# Patient Record
Sex: FEMALE | Race: WHITE | Employment: FULL TIME | URBAN - METROPOLITAN AREA
[De-identification: names, ages, dates, MRNs, and addresses within clinical notes are randomized per-mention and may not be internally consistent; named-entity substitution may affect disease eponyms.]

---

## 2018-04-13 ENCOUNTER — OFFICE VISIT (OUTPATIENT)
Dept: FAMILY MEDICINE CLINIC | Facility: CLINIC | Age: 33
End: 2018-04-13

## 2018-04-13 VITALS
SYSTOLIC BLOOD PRESSURE: 96 MMHG | HEART RATE: 72 BPM | WEIGHT: 97 LBS | DIASTOLIC BLOOD PRESSURE: 54 MMHG | BODY MASS INDEX: 17.85 KG/M2 | HEIGHT: 62 IN

## 2018-04-13 DIAGNOSIS — Z00.00 ROUTINE GENERAL MEDICAL EXAMINATION AT A HEALTH CARE FACILITY: Primary | ICD-10-CM

## 2018-04-13 PROCEDURE — 99214 OFFICE O/P EST MOD 30 MIN: CPT | Performed by: FAMILY MEDICINE

## 2018-04-13 NOTE — PROGRESS NOTES
Martir Hunter is a 28year old female who is here for Patient presents with:   Well Adult      HPI:     Here for physical    Has mole she is concnered about on leg    Screening:  Diet: tries to eat healthy - could eat more consistently, limit junk food [Other] [OTHER] Maternal Grandfather    • lung cancer [Other] [OTHER] Maternal Uncle       No past medical history on file. No past surgical history on file.    Social History:    Smoking status: Never Smoker

## 2018-04-13 NOTE — PATIENT INSTRUCTIONS
-- work on healthier, more protein rich snacking throughout the day to eat more consistently (nuts, eggs, beans, etc)  -- more cardio exercise  -- less salt in diet    -- come in for fasting bloodwork anytime that you are able to (8-10h no food, only alo

## 2018-04-20 ENCOUNTER — LAB ENCOUNTER (OUTPATIENT)
Dept: LAB | Age: 33
End: 2018-04-20
Attending: FAMILY MEDICINE
Payer: COMMERCIAL

## 2018-04-20 DIAGNOSIS — Z00.00 ROUTINE GENERAL MEDICAL EXAMINATION AT A HEALTH CARE FACILITY: ICD-10-CM

## 2018-04-20 PROCEDURE — 80050 GENERAL HEALTH PANEL: CPT | Performed by: FAMILY MEDICINE

## 2018-04-20 PROCEDURE — 82306 VITAMIN D 25 HYDROXY: CPT | Performed by: FAMILY MEDICINE

## 2018-04-20 PROCEDURE — 80061 LIPID PANEL: CPT | Performed by: FAMILY MEDICINE

## 2018-04-20 PROCEDURE — 36415 COLL VENOUS BLD VENIPUNCTURE: CPT | Performed by: FAMILY MEDICINE

## 2018-04-21 NOTE — PROGRESS NOTES
Vit D low - Vit D 50,000 units weekly x 12 wks, then recheck Vit D upon completion   -rest of labs normal

## 2018-04-23 ENCOUNTER — TELEPHONE (OUTPATIENT)
Dept: FAMILY MEDICINE CLINIC | Facility: CLINIC | Age: 33
End: 2018-04-23

## 2018-04-23 DIAGNOSIS — E55.9 VITAMIN D DEFICIENCY: Primary | ICD-10-CM

## 2018-04-23 RX ORDER — ERGOCALCIFEROL 1.25 MG/1
50000 CAPSULE ORAL WEEKLY
Qty: 12 CAPSULE | Refills: 0 | Status: SHIPPED | OUTPATIENT
Start: 2018-04-23 | End: 2018-07-22

## 2018-04-23 NOTE — TELEPHONE ENCOUNTER
Per Dr. Jono Vargas, the patient was informed of her test results and Dr. Nirmal Dyer recommendations via my chart. Vitamin d prescription was sent to the patient's preferred pharmacy and future vitamin d lab was ordered.

## 2018-04-23 NOTE — TELEPHONE ENCOUNTER
----- Message from Karen Rainey MD sent at 4/21/2018  1:24 PM CDT -----  Vit D low - Vit D 50,000 units weekly x 12 wks, then recheck Vit D upon completion   -rest of labs normal

## 2018-05-16 ENCOUNTER — TELEPHONE (OUTPATIENT)
Dept: OBGYN CLINIC | Facility: CLINIC | Age: 33
End: 2018-05-16

## 2018-05-16 DIAGNOSIS — O26.859 SPOTTING IN PREGNANCY: Primary | ICD-10-CM

## 2018-05-16 NOTE — TELEPHONE ENCOUNTER
LMP 4/15/18. First pregnancy, feeling well, no complaints. NOB appt scheduled. Pt advised to start PNV w/ DHA. Pt advised to call with any questions or concerns before appt. Patient verbalized understanding.

## 2018-05-16 NOTE — TELEPHONE ENCOUNTER
Patient called and wants to schedule a new OB appt. Date of the first day of her last period was April 15th.     [facility]PO

## 2018-05-21 ENCOUNTER — HOSPITAL ENCOUNTER (EMERGENCY)
Facility: HOSPITAL | Age: 33
Discharge: HOME | End: 2018-05-21
Attending: EMERGENCY MEDICINE
Payer: COMMERCIAL

## 2018-05-21 ENCOUNTER — LAB ENCOUNTER (OUTPATIENT)
Dept: LAB | Age: 33
End: 2018-05-21
Attending: OBSTETRICS & GYNECOLOGY
Payer: COMMERCIAL

## 2018-05-21 ENCOUNTER — APPOINTMENT (OUTPATIENT)
Dept: ULTRASOUND IMAGING | Facility: HOSPITAL | Age: 33
End: 2018-05-21
Attending: EMERGENCY MEDICINE
Payer: COMMERCIAL

## 2018-05-21 VITALS
TEMPERATURE: 97 F | BODY MASS INDEX: 18.03 KG/M2 | RESPIRATION RATE: 16 BRPM | WEIGHT: 98 LBS | OXYGEN SATURATION: 99 % | HEART RATE: 73 BPM | SYSTOLIC BLOOD PRESSURE: 105 MMHG | HEIGHT: 62 IN | DIASTOLIC BLOOD PRESSURE: 65 MMHG

## 2018-05-21 DIAGNOSIS — O03.9 SPONTANEOUS ABORTION: ICD-10-CM

## 2018-05-21 DIAGNOSIS — Z00.00 ROUTINE GENERAL MEDICAL EXAMINATION AT A HEALTH CARE FACILITY: Primary | ICD-10-CM

## 2018-05-21 DIAGNOSIS — O26.859 SPOTTING IN PREGNANCY: ICD-10-CM

## 2018-05-21 PROCEDURE — 84144 ASSAY OF PROGESTERONE: CPT | Performed by: EMERGENCY MEDICINE

## 2018-05-21 PROCEDURE — 81001 URINALYSIS AUTO W/SCOPE: CPT | Performed by: EMERGENCY MEDICINE

## 2018-05-21 PROCEDURE — 36415 COLL VENOUS BLD VENIPUNCTURE: CPT

## 2018-05-21 PROCEDURE — 86901 BLOOD TYPING SEROLOGIC RH(D): CPT | Performed by: EMERGENCY MEDICINE

## 2018-05-21 PROCEDURE — 76801 OB US < 14 WKS SINGLE FETUS: CPT | Performed by: EMERGENCY MEDICINE

## 2018-05-21 PROCEDURE — 86900 BLOOD TYPING SEROLOGIC ABO: CPT | Performed by: EMERGENCY MEDICINE

## 2018-05-21 PROCEDURE — 84702 CHORIONIC GONADOTROPIN TEST: CPT

## 2018-05-21 PROCEDURE — 84144 ASSAY OF PROGESTERONE: CPT

## 2018-05-21 PROCEDURE — 99284 EMERGENCY DEPT VISIT MOD MDM: CPT

## 2018-05-21 PROCEDURE — 86900 BLOOD TYPING SEROLOGIC ABO: CPT

## 2018-05-21 PROCEDURE — 80053 COMPREHEN METABOLIC PANEL: CPT | Performed by: EMERGENCY MEDICINE

## 2018-05-21 PROCEDURE — 85025 COMPLETE CBC W/AUTO DIFF WBC: CPT | Performed by: EMERGENCY MEDICINE

## 2018-05-21 PROCEDURE — 86901 BLOOD TYPING SEROLOGIC RH(D): CPT

## 2018-05-21 PROCEDURE — 76817 TRANSVAGINAL US OBSTETRIC: CPT | Performed by: EMERGENCY MEDICINE

## 2018-05-21 PROCEDURE — 84702 CHORIONIC GONADOTROPIN TEST: CPT | Performed by: EMERGENCY MEDICINE

## 2018-05-21 RX ORDER — IBUPROFEN 800 MG/1
800 TABLET ORAL EVERY 8 HOURS PRN
Qty: 30 TABLET | Refills: 0 | Status: SHIPPED | OUTPATIENT
Start: 2018-05-21 | End: 2018-05-28

## 2018-05-21 RX ORDER — CEPHALEXIN 500 MG/1
500 CAPSULE ORAL 3 TIMES DAILY
Qty: 15 CAPSULE | Refills: 0 | Status: SHIPPED | OUTPATIENT
Start: 2018-05-21 | End: 2018-05-26

## 2018-05-21 NOTE — ED INITIAL ASSESSMENT (HPI)
Pt comes to ED c/o vaginal bleeding since 2100 last night. + abdominal cramping. Pt states that she is currently 5 weeks pregnant. Pt states she only notes the bleeding when she is urinating.  She has been wearing a pad and has not gone through even one pad

## 2018-05-21 NOTE — ED NOTES
Patient updated on plan of care. Patient states she needs to urinate. Informed patient that ultrasound department states she is next to be scanned. Patient verbalized understanding.

## 2018-05-21 NOTE — TELEPHONE ENCOUNTER
NOB pending 6/13/18. Pt reports spotting and cramping starting last night; scant spotting during urination and when wiping. Pt denies any pain or burning with urination. Labs ordered per protocol. Appt scheduled for 5/24.

## 2018-05-21 NOTE — TELEPHONE ENCOUNTER
Patient is having cramping with light spotting when using washroom. Her first appt is scheduled for June 13.  Please call to advise

## 2018-05-21 NOTE — ED PROVIDER NOTES
Patient Seen in: [facility] Emergency Department    History   Patient presents with:  Eval-G (gynecologic)    Stated Complaint: 5 weeks pregnant- heavy vaginal bleeding    HPI    Patient presents to the emergency department with vaginal bleeding and neck is supple her lungs are clear to auscultation and heart has a regular rate and rhythm without murmurs rubs or gallops abdomen is soft nontender nondistended vaginal exam reveals bright red blood around the vaginal vault patient is absolutely unable to COMPARISON:  None. INDICATIONS:  5 weeks pregnant- heavy vaginal bleeding         TECHNIQUE:  Transabdominal / pelvic ultrasound examination was performed.          PATIENT STATED HISTORY: (As transcribed by Technologist)  Patient had a     positive diagnosis)  Spontaneous     Disposition:  Discharge  2018  6:18 pm    Follow-up:  Valarie Alexander, [address] [facility]  [phone]      on friday        Medications Prescribed:  Current Discharge Medication List

## 2018-05-21 NOTE — ED NOTES
Patient really uncomfortable stating she needs to urinate. RN gave patient option to urinate some and hold the rest. Patient declined option and states she will wait.

## 2018-05-22 ENCOUNTER — TELEPHONE (OUTPATIENT)
Dept: OBGYN UNIT | Facility: HOSPITAL | Age: 33
End: 2018-05-22

## 2018-05-22 NOTE — PROGRESS NOTES
Decrease in HCG consistent with spontaneous loss. Rh positive. Pt to keep appt with Dr Raine Amaya as scheduled.

## 2018-05-23 PROBLEM — O03.9 SAB (SPONTANEOUS ABORTION): Status: ACTIVE | Noted: 2018-05-23

## 2018-05-23 NOTE — PROGRESS NOTES
[facility]  Obstetrics and Gynecology  Follow Up Progress Note    Subjective:     Madhu Krishnamurthy is a 57 [address] old  female who presents for follow up SAB. Patient seen in ED with c/o vaginal bleeding and cramping on 18.  Beta HCG noted f Julieanne Claude is a 28year old  female who presents for SAB follow up     Patient Active Problem List:     SAB (spontaneous )     Left ovarian cyst        Plan:     SAB  - resolving   - d/w patient OB US findings and diagnosis of SAB includin

## 2018-05-24 ENCOUNTER — OFFICE VISIT (OUTPATIENT)
Dept: OBGYN CLINIC | Facility: CLINIC | Age: 33
End: 2018-05-24

## 2018-05-24 VITALS — SYSTOLIC BLOOD PRESSURE: 92 MMHG | DIASTOLIC BLOOD PRESSURE: 64 MMHG | BODY MASS INDEX: 18 KG/M2 | WEIGHT: 98.63 LBS

## 2018-05-24 DIAGNOSIS — O03.9 SAB (SPONTANEOUS ABORTION): Primary | ICD-10-CM

## 2018-05-24 DIAGNOSIS — N83.202 LEFT OVARIAN CYST: ICD-10-CM

## 2018-05-24 PROCEDURE — 99213 OFFICE O/P EST LOW 20 MIN: CPT | Performed by: OBSTETRICS & GYNECOLOGY

## 2018-05-24 NOTE — PATIENT INSTRUCTIONS
Repeat lab testing in 1 week and follow until pregnancy hormone level is negative     Pelvic US repeat in 6 weeks

## 2018-05-25 PROBLEM — N83.202 LEFT OVARIAN CYST: Status: ACTIVE | Noted: 2018-05-25

## 2018-05-31 ENCOUNTER — LAB ENCOUNTER (OUTPATIENT)
Dept: LAB | Age: 33
End: 2018-05-31
Attending: OBSTETRICS & GYNECOLOGY
Payer: COMMERCIAL

## 2018-05-31 DIAGNOSIS — O26.859 SPOTTING IN PREGNANCY: ICD-10-CM

## 2018-05-31 DIAGNOSIS — O03.9 SAB (SPONTANEOUS ABORTION): ICD-10-CM

## 2018-05-31 DIAGNOSIS — N83.202 LEFT OVARIAN CYST: ICD-10-CM

## 2018-05-31 PROCEDURE — 36415 COLL VENOUS BLD VENIPUNCTURE: CPT | Performed by: OBSTETRICS & GYNECOLOGY

## 2018-05-31 PROCEDURE — 84702 CHORIONIC GONADOTROPIN TEST: CPT | Performed by: OBSTETRICS & GYNECOLOGY

## 2018-05-31 PROCEDURE — 86304 IMMUNOASSAY TUMOR CA 125: CPT | Performed by: OBSTETRICS & GYNECOLOGY

## 2018-05-31 NOTE — PROGRESS NOTES
Patient informed of results. Verbalized understanding. No further questions or concerns. Call transferred to [facility] to schedule US.

## 2018-05-31 NOTE — PROGRESS NOTES
Please inform the patient of negative beta HCG results. SAB now resolved. Advised to consider waiting two menstrual cycles prior to attempting subsequent pregnancy if she desires. Advised repeat US in 6 weeks for ovarian cyst follow up.

## 2018-06-25 RX ORDER — ERGOCALCIFEROL 1.25 MG/1
CAPSULE ORAL
Qty: 12 CAPSULE | Refills: 0 | OUTPATIENT
Start: 2018-06-25

## 2018-06-25 NOTE — TELEPHONE ENCOUNTER
Refill of vit D not approved  Patient due to repeat lab 07/23/18.  Will advise on Vit D OTC after labs are completed

## 2018-07-02 ENCOUNTER — ULTRASOUND ENCOUNTER (OUTPATIENT)
Dept: OBGYN CLINIC | Facility: CLINIC | Age: 33
End: 2018-07-02

## 2018-11-09 ENCOUNTER — OFFICE VISIT (OUTPATIENT)
Dept: OBGYN CLINIC | Facility: CLINIC | Age: 33
End: 2018-11-09
Payer: COMMERCIAL

## 2018-11-09 VITALS
HEIGHT: 62 IN | BODY MASS INDEX: 19.1 KG/M2 | DIASTOLIC BLOOD PRESSURE: 76 MMHG | SYSTOLIC BLOOD PRESSURE: 110 MMHG | WEIGHT: 103.81 LBS

## 2018-11-09 DIAGNOSIS — N97.9 FEMALE INFERTILITY: ICD-10-CM

## 2018-11-09 DIAGNOSIS — Z01.419 WELL WOMAN EXAM WITH ROUTINE GYNECOLOGICAL EXAM: Primary | ICD-10-CM

## 2018-11-09 PROBLEM — O03.9 SAB (SPONTANEOUS ABORTION): Status: RESOLVED | Noted: 2018-05-23 | Resolved: 2018-11-09

## 2018-11-09 PROCEDURE — 99395 PREV VISIT EST AGE 18-39: CPT | Performed by: OBSTETRICS & GYNECOLOGY

## 2018-11-09 PROCEDURE — 87591 N.GONORRHOEAE DNA AMP PROB: CPT | Performed by: OBSTETRICS & GYNECOLOGY

## 2018-11-09 PROCEDURE — 87491 CHLMYD TRACH DNA AMP PROBE: CPT | Performed by: OBSTETRICS & GYNECOLOGY

## 2018-11-09 NOTE — PROGRESS NOTES
OB/GYN H&P     2018  10:46 AM    CC: Trying to conceive    HPI: Katherine Mejia is a 28year old  here for WWE and to discuss family planning. No issues. Had SAB in May of this year. Has been actively trying since then but no pregnancy.  + OP Cardiovascular: Negative. Gastrointestinal: Negative. Endocrine: Negative. Genitourinary: Negative. Musculoskeletal: Negative. Neurological: Negative. Hematological: Negative. Psychiatric/Behavioral: Negative.         Objective:    BP details. - Semen analysis for partner.   - Day 3 labs ordered   - pt declined referral to [facility] now   - interested in Clomid cycles initially   - will follow up after semen Analysis and Day 3 labs.    - discussed potential US to follow up ovarian cysts and

## 2018-11-26 ENCOUNTER — LAB ENCOUNTER (OUTPATIENT)
Dept: LAB | Age: 33
End: 2018-11-26
Attending: OBSTETRICS & GYNECOLOGY
Payer: COMMERCIAL

## 2018-11-26 DIAGNOSIS — N97.9 FEMALE INFERTILITY: ICD-10-CM

## 2018-11-26 PROCEDURE — 83001 ASSAY OF GONADOTROPIN (FSH): CPT | Performed by: OBSTETRICS & GYNECOLOGY

## 2018-11-26 PROCEDURE — 84439 ASSAY OF FREE THYROXINE: CPT | Performed by: OBSTETRICS & GYNECOLOGY

## 2018-11-26 PROCEDURE — 36415 COLL VENOUS BLD VENIPUNCTURE: CPT | Performed by: OBSTETRICS & GYNECOLOGY

## 2018-11-26 PROCEDURE — 84144 ASSAY OF PROGESTERONE: CPT | Performed by: OBSTETRICS & GYNECOLOGY

## 2018-11-26 PROCEDURE — 84443 ASSAY THYROID STIM HORMONE: CPT | Performed by: OBSTETRICS & GYNECOLOGY

## 2018-11-26 PROCEDURE — 82670 ASSAY OF TOTAL ESTRADIOL: CPT | Performed by: OBSTETRICS & GYNECOLOGY

## 2018-11-26 PROCEDURE — 84146 ASSAY OF PROLACTIN: CPT | Performed by: OBSTETRICS & GYNECOLOGY

## 2018-11-26 PROCEDURE — 83002 ASSAY OF GONADOTROPIN (LH): CPT | Performed by: OBSTETRICS & GYNECOLOGY

## 2018-12-17 ENCOUNTER — TELEPHONE (OUTPATIENT)
Dept: OBGYN CLINIC | Facility: CLINIC | Age: 33
End: 2018-12-17

## 2018-12-18 ENCOUNTER — OFFICE VISIT (OUTPATIENT)
Dept: OBGYN CLINIC | Facility: CLINIC | Age: 33
End: 2018-12-18
Payer: COMMERCIAL

## 2018-12-18 VITALS
DIASTOLIC BLOOD PRESSURE: 58 MMHG | HEIGHT: 62 IN | BODY MASS INDEX: 19.51 KG/M2 | SYSTOLIC BLOOD PRESSURE: 90 MMHG | WEIGHT: 106 LBS

## 2018-12-18 DIAGNOSIS — N97.9 FEMALE INFERTILITY: Primary | ICD-10-CM

## 2018-12-18 PROCEDURE — 99214 OFFICE O/P EST MOD 30 MIN: CPT | Performed by: OBSTETRICS & GYNECOLOGY

## 2018-12-18 NOTE — PROGRESS NOTES
Patient presents with: Follow - Up: review test results and plan of care     S:   Pt is here to discuss labs. Her  did the Semen Analysis and results were normal. Good motility and volume. Pt is frustrated.    Getting positive on OPKs, but no preg

## 2019-01-28 ENCOUNTER — TELEPHONE (OUTPATIENT)
Dept: OBGYN CLINIC | Facility: CLINIC | Age: 34
End: 2019-01-28

## 2019-01-28 NOTE — TELEPHONE ENCOUNTER
Returned patient's call. Questions answered about when to take medication etc. Patient states understanding.

## 2019-02-20 ENCOUNTER — OFFICE VISIT (OUTPATIENT)
Dept: OBGYN CLINIC | Facility: CLINIC | Age: 34
End: 2019-02-20
Payer: COMMERCIAL

## 2019-02-20 VITALS
SYSTOLIC BLOOD PRESSURE: 100 MMHG | HEIGHT: 62 IN | DIASTOLIC BLOOD PRESSURE: 62 MMHG | WEIGHT: 106.81 LBS | BODY MASS INDEX: 19.66 KG/M2 | HEART RATE: 102 BPM

## 2019-02-20 DIAGNOSIS — N84.0 UTERINE POLYP: ICD-10-CM

## 2019-02-20 DIAGNOSIS — N97.9 FEMALE INFERTILITY: Primary | ICD-10-CM

## 2019-02-20 PROCEDURE — 99214 OFFICE O/P EST MOD 30 MIN: CPT | Performed by: OBSTETRICS & GYNECOLOGY

## 2019-02-20 NOTE — H&P
OB/GYN H&P     2019  9:30 AM    CC: Patient presents with: Other: Pt is here for a f/u. HPI: Ananda Duong is a 27year old  here for follow up appt s/p 2 cycles of Clomid.    Started with 50 mg on first cycle then 100 mg on second cycle point review of systems was completed. Pertinent positives and negatives noted in the the HPI.     Objective:    /62   Pulse 102   Ht 62\"   Wt 106 lb 12.8 oz   LMP 01/24/2019   BMI 19.53 kg/m²   Physical Exam    Constitutional: She is oriented to pe

## 2019-02-21 ENCOUNTER — TELEPHONE (OUTPATIENT)
Dept: OBGYN CLINIC | Facility: CLINIC | Age: 34
End: 2019-02-21

## 2019-02-21 DIAGNOSIS — N84.0 UTERINE POLYP: ICD-10-CM

## 2019-02-21 DIAGNOSIS — N97.9 FEMALE INFERTILITY: Primary | ICD-10-CM

## 2019-02-21 NOTE — TELEPHONE ENCOUNTER
----- Message from JOSE BURGER [facility] MD DEVIKA sent at 2/20/2019  9:25 AM CST -----  Regarding: try for March dates  Surgeon: Dr. ROMAN [facility] [place]   Assistant: Yes     Type of Admit: Hospital outpatient, does not require admission following surgery    Procedure Location: [facility]

## 2019-02-22 NOTE — TELEPHONE ENCOUNTER
Contacted surgery scheduling dept. 3/28/19 at 0945 available. Contacted patient. She agrees to date and time. Instructions given and assisted with postop appt scheduling. Questions answered and patient states understanding.     Scheduled via epic  Added

## 2019-03-01 NOTE — TELEPHONE ENCOUNTER
Patient re-scheduled for 04/17/19 @ 5947. Post-op appointment re-scheduled. No further questions or concerns at this time. Case change request sent via Epic.

## 2019-03-05 NOTE — TELEPHONE ENCOUNTER
Call to [facility] of [address] at [phone]. Unable to speak with representative. Office closes at [address].

## 2019-03-06 RX ORDER — HEPARIN SODIUM 5000 [USP'U]/ML
5000 INJECTION, SOLUTION INTRAVENOUS; SUBCUTANEOUS ONCE
Status: CANCELLED | OUTPATIENT
Start: 2019-03-06 | End: 2019-03-06

## 2019-03-06 NOTE — TELEPHONE ENCOUNTER
Call to Lance Gregory of [facility] [address] at [phone]; spoke with [facility]. Provided CPT 86234, [id number], [id number] with Dx N97.9 and N84.0. No PA required.  Call ref # [id number]

## 2019-03-19 ENCOUNTER — APPOINTMENT (OUTPATIENT)
Dept: LAB | Age: 34
End: 2019-03-19
Attending: OBSTETRICS & GYNECOLOGY
Payer: COMMERCIAL

## 2019-03-19 DIAGNOSIS — N97.9 FEMALE INFERTILITY: ICD-10-CM

## 2019-03-19 LAB
DEPRECATED RDW RBC AUTO: 44.1 FL (ref 35.1–46.3)
ERYTHROCYTE [DISTWIDTH] IN BLOOD BY AUTOMATED COUNT: 12.8 % (ref 11–15)
HCT VFR BLD AUTO: 38.8 % (ref 35–48)
HGB BLD-MCNC: 12.4 G/DL (ref 12–16)
MCH RBC QN AUTO: 30.1 PG (ref 26–34)
MCHC RBC AUTO-ENTMCNC: 32 G/DL (ref 31–37)
MCV RBC AUTO: 94.2 FL (ref 80–100)
PLATELET # BLD AUTO: 231 10(3)UL (ref 150–450)
RBC # BLD AUTO: 4.12 X10(6)UL (ref 3.8–5.3)
WBC # BLD AUTO: 4.1 X10(3) UL (ref 4–11)

## 2019-03-19 PROCEDURE — 36415 COLL VENOUS BLD VENIPUNCTURE: CPT | Performed by: OBSTETRICS & GYNECOLOGY

## 2019-03-19 PROCEDURE — 85027 COMPLETE CBC AUTOMATED: CPT | Performed by: OBSTETRICS & GYNECOLOGY

## 2019-03-27 ENCOUNTER — OFFICE VISIT (OUTPATIENT)
Dept: OBGYN CLINIC | Facility: CLINIC | Age: 34
End: 2019-03-27
Payer: COMMERCIAL

## 2019-03-27 VITALS
HEIGHT: 62 IN | WEIGHT: 109.19 LBS | BODY MASS INDEX: 20.09 KG/M2 | DIASTOLIC BLOOD PRESSURE: 64 MMHG | SYSTOLIC BLOOD PRESSURE: 112 MMHG | HEART RATE: 90 BPM

## 2019-03-27 DIAGNOSIS — N63.20 LEFT BREAST LUMP: Primary | ICD-10-CM

## 2019-03-27 PROCEDURE — 99213 OFFICE O/P EST LOW 20 MIN: CPT | Performed by: NURSE PRACTITIONER

## 2019-03-27 NOTE — PROGRESS NOTES
S:  Patient is here with a 1 week history of a left breast lump. It has not been tender, although she feels she has been pushing so much she may have caused some discomfort.  She denies any skin changes, nipple retraction or discharge, or other lumps/ errol

## 2019-04-03 ENCOUNTER — PATIENT MESSAGE (OUTPATIENT)
Dept: FAMILY MEDICINE CLINIC | Facility: CLINIC | Age: 34
End: 2019-04-03

## 2019-04-03 RX ORDER — VALACYCLOVIR HYDROCHLORIDE 500 MG/1
500 TABLET, FILM COATED ORAL 2 TIMES DAILY
Qty: 6 TABLET | Refills: 0 | Status: SHIPPED | OUTPATIENT
Start: 2019-04-03

## 2019-04-03 RX ORDER — ACYCLOVIR 50 MG/G
1 CREAM TOPICAL
Qty: 5 G | Refills: 0 | Status: SHIPPED | OUTPATIENT
Start: 2019-04-03

## 2019-04-03 NOTE — TELEPHONE ENCOUNTER
From: Zenon Hashimoto  To: Luis Fernando Izaguirre MD  Sent: 4/3/2019 1:16 PM CDT  Subject: Prescription Question    I have my wedding on Saturday and ended up getting a cold sore. Is there anything I could get to take to help relieve it faster? Please help!!   V

## 2019-04-10 ENCOUNTER — HOSPITAL ENCOUNTER (OUTPATIENT)
Dept: MAMMOGRAPHY | Facility: HOSPITAL | Age: 34
Discharge: HOME | End: 2019-04-10
Attending: NURSE PRACTITIONER
Payer: COMMERCIAL

## 2019-04-10 DIAGNOSIS — N63.20 LEFT BREAST LUMP: ICD-10-CM

## 2019-04-10 PROCEDURE — 77066 DX MAMMO INCL CAD BI: CPT | Performed by: NURSE PRACTITIONER

## 2019-04-10 PROCEDURE — 76641 ULTRASOUND BREAST COMPLETE: CPT | Performed by: NURSE PRACTITIONER

## 2019-04-10 PROCEDURE — 77062 BREAST TOMOSYNTHESIS BI: CPT | Performed by: NURSE PRACTITIONER

## 2019-04-17 ENCOUNTER — HOSPITAL ENCOUNTER (OUTPATIENT)
Facility: HOSPITAL | Age: 34
Setting detail: HOSPITAL OUTPATIENT SURGERY
Discharge: HOME | End: 2019-04-17
Attending: OBSTETRICS & GYNECOLOGY | Admitting: OBSTETRICS & GYNECOLOGY
Payer: COMMERCIAL

## 2019-04-17 ENCOUNTER — ANESTHESIA (OUTPATIENT)
Dept: SURGERY | Facility: HOSPITAL | Age: 34
End: 2019-04-17
Payer: COMMERCIAL

## 2019-04-17 ENCOUNTER — ANESTHESIA EVENT (OUTPATIENT)
Dept: SURGERY | Facility: HOSPITAL | Age: 34
End: 2019-04-17
Payer: COMMERCIAL

## 2019-04-17 VITALS
SYSTOLIC BLOOD PRESSURE: 99 MMHG | DIASTOLIC BLOOD PRESSURE: 62 MMHG | TEMPERATURE: 97 F | HEART RATE: 56 BPM | BODY MASS INDEX: 19.45 KG/M2 | OXYGEN SATURATION: 98 % | WEIGHT: 105.69 LBS | RESPIRATION RATE: 18 BRPM | HEIGHT: 62 IN

## 2019-04-17 DIAGNOSIS — N84.0 UTERINE POLYP: ICD-10-CM

## 2019-04-17 DIAGNOSIS — N97.9 FEMALE INFERTILITY: Primary | ICD-10-CM

## 2019-04-17 PROCEDURE — 58662 LAPAROSCOPY EXCISE LESIONS: CPT | Performed by: OBSTETRICS & GYNECOLOGY

## 2019-04-17 PROCEDURE — 0UBF4ZZ EXCISION OF CUL-DE-SAC, PERCUTANEOUS ENDOSCOPIC APPROACH: ICD-10-PCS | Performed by: OBSTETRICS & GYNECOLOGY

## 2019-04-17 PROCEDURE — 58558 HYSTEROSCOPY BIOPSY: CPT | Performed by: OBSTETRICS & GYNECOLOGY

## 2019-04-17 PROCEDURE — 3E0P7KZ INTRODUCTION OF OTHER DIAGNOSTIC SUBSTANCE INTO FEMALE REPRODUCTIVE, VIA NATURAL OR ARTIFICIAL OPENING: ICD-10-PCS | Performed by: OBSTETRICS & GYNECOLOGY

## 2019-04-17 PROCEDURE — 58350 REOPEN FALLOPIAN TUBE: CPT | Performed by: OBSTETRICS & GYNECOLOGY

## 2019-04-17 PROCEDURE — 0UB98ZZ EXCISION OF UTERUS, VIA NATURAL OR ARTIFICIAL OPENING ENDOSCOPIC: ICD-10-PCS | Performed by: OBSTETRICS & GYNECOLOGY

## 2019-04-17 RX ORDER — METOCLOPRAMIDE HYDROCHLORIDE 5 MG/ML
10 INJECTION INTRAMUSCULAR; INTRAVENOUS AS NEEDED
Status: DISCONTINUED | OUTPATIENT
Start: 2019-04-17 | End: 2019-04-17

## 2019-04-17 RX ORDER — DOXYCYCLINE HYCLATE 100 MG
100 TABLET ORAL 2 TIMES DAILY
Qty: 10 TABLET | Refills: 0 | Status: SHIPPED | OUTPATIENT
Start: 2019-04-17 | End: 2019-04-22

## 2019-04-17 RX ORDER — ACETAMINOPHEN 500 MG
1000 TABLET ORAL ONCE
Status: DISCONTINUED | OUTPATIENT
Start: 2019-04-17 | End: 2019-04-17 | Stop reason: HOSPADM

## 2019-04-17 RX ORDER — NALOXONE HYDROCHLORIDE 0.4 MG/ML
80 INJECTION, SOLUTION INTRAMUSCULAR; INTRAVENOUS; SUBCUTANEOUS AS NEEDED
Status: DISCONTINUED | OUTPATIENT
Start: 2019-04-17 | End: 2019-04-17

## 2019-04-17 RX ORDER — SODIUM CHLORIDE, SODIUM LACTATE, POTASSIUM CHLORIDE, CALCIUM CHLORIDE 600; 310; 30; 20 MG/100ML; MG/100ML; MG/100ML; MG/100ML
INJECTION, SOLUTION INTRAVENOUS CONTINUOUS
Status: DISCONTINUED | OUTPATIENT
Start: 2019-04-17 | End: 2019-04-17

## 2019-04-17 RX ORDER — HYDROCODONE BITARTRATE AND ACETAMINOPHEN 5; 325 MG/1; MG/1
2 TABLET ORAL AS NEEDED
Status: COMPLETED | OUTPATIENT
Start: 2019-04-17 | End: 2019-04-17

## 2019-04-17 RX ORDER — HYDROCODONE BITARTRATE AND ACETAMINOPHEN 5; 325 MG/1; MG/1
1 TABLET ORAL AS NEEDED
Status: COMPLETED | OUTPATIENT
Start: 2019-04-17 | End: 2019-04-17

## 2019-04-17 RX ORDER — DEXAMETHASONE SODIUM PHOSPHATE 4 MG/ML
4 VIAL (ML) INJECTION AS NEEDED
Status: DISCONTINUED | OUTPATIENT
Start: 2019-04-17 | End: 2019-04-17

## 2019-04-17 RX ORDER — HYDROMORPHONE HYDROCHLORIDE 1 MG/ML
0.4 INJECTION, SOLUTION INTRAMUSCULAR; INTRAVENOUS; SUBCUTANEOUS EVERY 5 MIN PRN
Status: DISCONTINUED | OUTPATIENT
Start: 2019-04-17 | End: 2019-04-17

## 2019-04-17 RX ORDER — ONDANSETRON 2 MG/ML
4 INJECTION INTRAMUSCULAR; INTRAVENOUS AS NEEDED
Status: DISCONTINUED | OUTPATIENT
Start: 2019-04-17 | End: 2019-04-17

## 2019-04-17 RX ORDER — LIDOCAINE HYDROCHLORIDE 10 MG/ML
INJECTION, SOLUTION INFILTRATION; PERINEURAL AS NEEDED
Status: DISCONTINUED | OUTPATIENT
Start: 2019-04-17 | End: 2019-04-17 | Stop reason: HOSPADM

## 2019-04-17 RX ORDER — ACETAMINOPHEN 500 MG
1000 TABLET ORAL ONCE
COMMUNITY
End: 2019-05-03 | Stop reason: ALTCHOICE

## 2019-04-17 NOTE — ANESTHESIA PREPROCEDURE EVALUATION
PRE-OP EVALUATION    Patient Name: Alphonso Dumont    Pre-op Diagnosis: Female infertility [N97.9]  Uterine polyp [N84.0]    Procedure(s):  diagnostic laparoscopy, chromopertubation, dilation, hysteroscopy, hysteroscopic polypectomy    Surgeon(s) and Role: ROM: full Cardiovascular    Cardiovascular exam normal.  Rhythm: regular  Rate: normal     Dental    No notable dental history.          Pulmonary    Pulmonary exam normal.                 Other findings            ASA: 1   Plan: MAC  NPO status verified an

## 2019-04-17 NOTE — H&P
[facility]  Obstetrics and Gynecology  History & Physical    Crystal Champagne Patient Status:  Hospital Outpatient Surgery    2/15/1985 MRN [id number]   Location [address] [place] [address] Attending Re [facility] Day 0 PCP (Other) Mother    • Hypertension Maternal Grandmother    • Stroke Maternal Grandmother    • Ovarian Cancer Maternal Grandmother    • Hypertension Maternal Grandfather    • Prostate Cancer Maternal Grandfather    • Other (throat cancer) Maternal Grandfather diagnostic laparoscopy and chromopertubation.      Patient Active Problem List:     Left ovarian cyst      - Admit for outpatient surgical procedure   - IVF: LR @ 100 cc/hr   - Diet: NPO  - Meds: no preoperative antibiotics indicated at this time   - VTE pr

## 2019-04-17 NOTE — ANESTHESIA POSTPROCEDURE EVALUATION
[facility] Patient Status:  Hospital Outpatient Surgery   Age/Gender 27year old female MRN [id number]   [facility] SURGERY Attending [address] [place] [address] Day # 0 PCP Akbar Kelley MD       Anesthesia Post-op Note

## 2019-04-17 NOTE — OPERATIVE REPORT
OPERATIVE REPORT: Operative laparoscopy, resection of endometriosis, hysteroscopic polypectomy and chromopertubation    Patient: Gladys Joaquin  MRN: [id number]  Date: 04/17/19    Pre op diagnyosis  Female infertility  Endometrial polyp    Post op diagnosis using a urinary catheter at the beginning of the procedure. Attention was then turned towards the abdomen. A small vertical 5 mm incision was made within the inferior portion of umbilicus.  A Veress needle was then placed through the incision and advan traction and countertraction. The resection was made using laparoscopic scissor. The specimen was sent to pathology. Monopolar energy used to stop small bleeders around the peritoneum. Good hemostasis noted.    The mesentery was then suctioned out of t

## 2019-05-03 ENCOUNTER — OFFICE VISIT (OUTPATIENT)
Dept: OBGYN CLINIC | Facility: CLINIC | Age: 34
End: 2019-05-03
Payer: COMMERCIAL

## 2019-05-03 ENCOUNTER — LAB ENCOUNTER (OUTPATIENT)
Dept: LAB | Age: 34
End: 2019-05-03
Attending: OBSTETRICS & GYNECOLOGY
Payer: COMMERCIAL

## 2019-05-03 VITALS
WEIGHT: 105.63 LBS | SYSTOLIC BLOOD PRESSURE: 104 MMHG | HEART RATE: 67 BPM | BODY MASS INDEX: 19.44 KG/M2 | DIASTOLIC BLOOD PRESSURE: 64 MMHG | HEIGHT: 62 IN

## 2019-05-03 DIAGNOSIS — N97.9 FEMALE INFERTILITY: Primary | ICD-10-CM

## 2019-05-03 DIAGNOSIS — N80.9 ENDOMETRIOSIS: ICD-10-CM

## 2019-05-03 DIAGNOSIS — Z09 POSTOP CHECK: ICD-10-CM

## 2019-05-03 PROCEDURE — 83520 IMMUNOASSAY QUANT NOS NONAB: CPT | Performed by: OBSTETRICS & GYNECOLOGY

## 2019-05-03 PROCEDURE — 36415 COLL VENOUS BLD VENIPUNCTURE: CPT | Performed by: OBSTETRICS & GYNECOLOGY

## 2019-05-03 PROCEDURE — 99024 POSTOP FOLLOW-UP VISIT: CPT | Performed by: OBSTETRICS & GYNECOLOGY

## 2019-05-04 NOTE — PROGRESS NOTES
Patient presents with:  Post-Op: Pt states she is doing fine. S: pt is here for follow up s/p [address], chromopertubation and [address] done for evaluation of infertility. Here today with  and tearful.    She is on CD#5 and has started 100 mg of Clomid

## 2019-05-30 ENCOUNTER — TELEPHONE (OUTPATIENT)
Dept: OBGYN CLINIC | Facility: CLINIC | Age: 34
End: 2019-05-30

## 2019-05-30 NOTE — TELEPHONE ENCOUNTER
Patient has appointment scheduled for tomorrow with [facility]. Needs labs, pathology and op report for appointment. Faxed to .

## 2020-03-03 ENCOUNTER — PATIENT OUTREACH (OUTPATIENT)
Dept: FAMILY MEDICINE CLINIC | Facility: CLINIC | Age: 35
End: 2020-03-03

## 2020-03-03 NOTE — PROGRESS NOTES
Patient is due for Wellness Visit. Left message for patient to call office. Patient needs appointment. Letter sent.

## 2020-06-24 ENCOUNTER — PATIENT MESSAGE (OUTPATIENT)
Dept: FAMILY MEDICINE CLINIC | Facility: CLINIC | Age: 35
End: 2020-06-24

## 2020-06-24 DIAGNOSIS — R50.9 FEVER, UNSPECIFIED FEVER CAUSE: Primary | ICD-10-CM

## 2020-06-24 NOTE — TELEPHONE ENCOUNTER
From: Alicia Roach  To: Lito Lynch MD  Sent: 6/24/2020 11:47 AM CDT  Subject: Other    Dr Lyn Zamorano,    Early Tuesday morning I developed a fever (101.1) with chills/sweating. Although my fever has since went down slightly today. ..it is now between 80-

## 2021-08-08 NOTE — PROGRESS NOTES
Phone note:   Noticed pt's contacted office at 1/19 after hours  Called pt today   No periods yet  No pos preg test   Clomid refill sent to pharmacy  Will start day  3-7   Timed intercourse   Skip scheduled appt if she gets a period and reschedule in 3.5 w show

## (undated) DEVICE — MEDI-VAC NON-CONDUCTIVE SUCTION TUBING: Brand: CARDINAL HEALTH

## (undated) DEVICE — OUTFLOW HYSTER S&N

## (undated) DEVICE — KENDALL SCD EXPRESS SLEEVES, KNEE LENGTH, MEDIUM: Brand: KENDALL SCD

## (undated) DEVICE — DEV REMOVAL TRUCLEAR SFT MINI

## (undated) DEVICE — SOL  .9 3000ML

## (undated) DEVICE — HYSTEROSCOPIC INFLOW TUBE SET

## (undated) DEVICE — TROCAR: Brand: KII SLEEVE

## (undated) DEVICE — INSUFFLATION NEEDLE TO ESTABLISH PNEUMOPERITONEUM.: Brand: INSUFFLATION NEEDLE

## (undated) DEVICE — Device

## (undated) DEVICE — TROCAR: Brand: KII FIOS FIRST ENTRY

## (undated) DEVICE — TROCAR: Brand: KII® SLEEVE

## (undated) DEVICE — 2000CC GUARDIAN II: Brand: GUARDIAN

## (undated) DEVICE — SPECIMEN SOCK - STANDARD: Brand: MEDI-VAC

## (undated) DEVICE — SUTURE MONOCRYL 4-0 PS-2

## (undated) DEVICE — GYN CDS: Brand: MEDLINE INDUSTRIES, INC.

## (undated) DEVICE — STERILE POLYISOPRENE POWDER-FREE SURGICAL GLOVES: Brand: PROTEXIS

## (undated) DEVICE — SOL  .9 1000ML BTL

## (undated) DEVICE — DERMABOND LIQUID ADHESIVE

## (undated) NOTE — LETTER
03/03/20        Dear Mesfin Cavanaugh       In our system Dr. Anastasiia Woodall is shown to be your primary care provider, you are due for an Annual Wellness Visit. Can you please call our office to schedule appointment.      If you are being evaluated by another p

## (undated) NOTE — ED AVS SNAPSHOT
Candy Espinoza   MRN: [id number]    Department:  [facility] Emergency Department   Date of Visit:  5/21/2018           Disclosure     Insurance plans vary and the physician(s) referred by the ER may not be covered by your plan.  Please contact you tell this physician (or your personal doctor if your instructions are to return to your personal doctor) about any new or lasting problems. The primary care or specialist physician will see patients referred from the [facility] Emergency Department.  Balta Marquez

## (undated) NOTE — LETTER
Date & Time: 5/21/2018, 6:18 PM  Patient: Juan Bradley  Encounter Provider(s):    Marvin Moise MD       To Whom It May Concern:    Donnetta Bernheim was seen and treated in our department on 5/21/2018.  She will be off work both today and tomorrow    If